# Patient Record
Sex: FEMALE | Race: WHITE | Employment: FULL TIME | URBAN - METROPOLITAN AREA
[De-identification: names, ages, dates, MRNs, and addresses within clinical notes are randomized per-mention and may not be internally consistent; named-entity substitution may affect disease eponyms.]

---

## 2022-11-22 ENCOUNTER — TELEPHONE (OUTPATIENT)
Dept: FAMILY MEDICINE CLINIC | Age: 47
End: 2022-11-22

## 2022-11-22 NOTE — TELEPHONE ENCOUNTER
----- Message from April Cook sent at 11/22/2022  8:52 AM EST -----  Subject: Referral Request    Reason for referral request? Dr. Marlon Denny, Pt is wanting to know if she can   have her immunization records for her new job - she needs to provide her   shot records and believes she had MMR in [id number], can a nurse view this   and call pt? Provider patient wants to be referred to(if known):     Provider Phone Number(if known):     Additional Information for Provider?   ---------------------------------------------------------------------------  --------------  [id number]    [phone]; OK to leave message on voicemail  ---------------------------------------------------------------------------  --------------

## 2023-12-19 RX ORDER — PROMETHAZINE HYDROCHLORIDE 25 MG/1
25 SUPPOSITORY RECTAL EVERY 6 HOURS PRN
COMMUNITY
Start: 2023-12-08 | End: 2023-12-19

## 2023-12-19 RX ORDER — ACETAMINOPHEN 500 MG
TABLET ORAL EVERY 4 HOURS PRN
COMMUNITY

## 2023-12-19 RX ORDER — ESZOPICLONE 3 MG/1
3 TABLET, FILM COATED ORAL PRN
COMMUNITY

## 2023-12-19 RX ORDER — ESZOPICLONE 2 MG/1
2 TABLET, FILM COATED ORAL NIGHTLY
COMMUNITY
End: 2023-12-19

## 2023-12-19 RX ORDER — ONDANSETRON HYDROCHLORIDE 8 MG/1
8 TABLET, FILM COATED ORAL EVERY 8 HOURS PRN
COMMUNITY
Start: 2023-12-02 | End: 2023-12-19

## 2023-12-19 NOTE — PROGRESS NOTES
12/19/2023 1146 AM:       [address] TO PROCEDURE DATE:    1. PLEASE FOLLOW ANY INSTRUCTIONS GIVEN TO YOU PER YOUR SURGEON. 2. Arrange for someone to drive you home and be with you for the first 24 hours after discharge for your safety after your procedure for which you received sedation. Ensure it is someone we can share information with regarding your discharge. NOTE: At this time ONLY 2 ADULTS may accompany you. NO CHILDREN UNDER AGE OF 16. One person ENCOURAGED to stay at hospital entire time if outpatient surgery      3. You must contact your surgeon for instructions IF:  You are taking any blood thinners, aspirin, anti-inflammatory or vitamins. Contact your ordering physician/surgeon for medication instructions as soon as possible, especially if taking blood thinners, aspirin, heart, or diabetic medication. STOP SUPPLEMENTS/VITAMINS/NON-STEROIDAL ANTI-INFLAMMATORY MEDICATION 7 DAYS PRIOR TO PROCEDURE. There is a change in your physical condition such as a cold, fever, rash, cuts, sores, or any other infection, especially near your surgical site. 4. Do not drink alcohol the day before or day of your procedure. Do not use any recreational marijuana at least 24 hours or street drugs (heroin, cocaine) at minimum 5 days prior to your procedure. 5. A Pre-Surgical History and Physical MUST be completed WITHIN 30 DAYS OR LESS prior to your procedure. by your Physician or an Urgent Care        THE DAY OF YOUR PROCEDURE:  1. Follow instructions for ARRIVAL TIME as DIRECTED BY YOUR SURGEON. 2. Enter the MAIN entrance from [address] and follow the signs to the [employer] or [employer] (offered free of charge 7 am-5pm). 3. Enter the Main Entrance of the hospital (do not enter from the lower level of the parking garage).  Upon entrance, check in with the  at the surgical information desk on your LEFT.   Bring your insurance card and photo ID to register      4. DO NOT EAT ANYTHING 8 hours prior to arrival for surgery. You may have up to 8 ounces of water 4 hours prior to your arrival for surgery. NOTE: ALL Gastric, Bariatric & Bowel surgery patients - you MUST follow your surgeon's instructions regarding eating/ drinking as you will have very specific instructions to follow. If you did not receive these, call your surgeon's office immediately. NONE  5. MEDICATIONS:  Take the following medications with a SMALL sip of water: NONE  Use your usual dose of inhalers the morning of surgery. BRING your rescue inhaler with you to hospital.   Anesthesia does NOT want you to take insulin the morning of surgery. They will control your blood sugar while you are at the hospital. Please contact your ordering physician for instructions regarding your insulin the night before your procedure. If you have an insulin pump, please keep it set on basal rate. Bariatric patient's call your surgeon if on diabetic medications as some may need to be stopped 1 week prior to surgery    6. Do not swallow additional water when brushing teeth. No gum, candy, mints, or ice chips. Refrain from smoking or at least decrease the amount on day of surgery. 7. Morning of surgery:   Take a shower with an antibacterial soap (i.e., Safeguard or Dial) OR your physician may have instructed you to use Hibiclens. Dress in loose, comfortable clothing appropriate for redressing after your procedure. Do not wear jewelry (including body piercings), make-up (especially NO eye make-up), fingernail polish (NO toenail polish if foot/leg surgery), lotion, powders, or metal hairclips. Do not shave or wax for 72 hours prior to procedure near your operative site. Shaving with a razor can irritate your skin and make it easier to develop an infection.  On the day of your procedure, any hair that needs to be removed near the surgical site will be

## 2023-12-19 NOTE — PROGRESS NOTES
12/19/2023 1148 AM:    PRESURGICAL BATHING INSTRUCTIONS  [facility] takes many steps to prevent infections during surgery. One way is to provide   you with 4% Chlorhexidine Gluconate (CHG), a special antiseptic soap to wash your skin prior to   surgery. By thoroughly washing your skin, you can reduce the number of germs and help us   prevent an infection. Skin prep is a very important part of getting you ready for surgery. Please   follow the instructions listed below. Do NOT use if you have had an allergic reaction to CHG   previously. Common Brand names:  Betasept, Hibiclens, Hibistat, Exidine, BioScrub, Cristina-Hex, Peridex, Clorostat      Showering Steps Before Your Procedure:  Wash your hair, face and body using your regular soap and shampoo. Rinse your hair and body thoroughly to remove any soap or shampoo residue. Turn the water off to prevent rinsing the antiseptic soap (CHG) off too soon. Wash the body gently for 5 minutes using a clean washcloth wet down with the CHG soap on it. Apply the Chlorhexidine Gluconate (CHG) soap to your entire body only from the neck down. Do not use on your face, eyes, ears, hair or genital area to avoid permanent injury to those areas. Do not scrub the skin too hard. Wash thoroughly paying special attention to the area   where the surgery or procedure will be done. Do not wash with regular soap once CHG is used. Turn the water back on and rinse the body off thoroughly. Pat yourself dry with a clean fresh towel after each shower. Put on clean clothes after each shower. Do not put on lotions or powders after bathing. If any kind of rash appears, stop use and contact your surgeon    A bottle of Chlorhexidine Gluconate CHG) can be purchased at most local pharmacy chain stores. The soap may come in a liquid form, wipes or scrub brush applicator. Any form is fine. Remember   to use prior to your surgery.      If you have any questions, please call and speak with a [facility] PreAdmission Testing   Nurse at [phone].

## 2023-12-19 NOTE — PROGRESS NOTES
12/19/2023 1057 AM:    LMOR W/INSTRUCTIONS AND EMAILED TO PT/TS    H&P IN [facility] 12/11/2023, LABS IN [facility] 12/11/2023- PT/INR/PTT T&S NOT DONE W/H&P-ORDERED DOS/TS EKG IN MEDIA 12/12/2023/TS

## 2023-12-19 NOTE — PROGRESS NOTES
PRE-OP INSTRUCTIONS FOR SURGICAL PATIENTS          Our Pre-admission Testing Nurses tried and were unable to reach you today. Please read the attached instructions AND listen to your voicemail. IF YOU HAVE ANY QUESTIONS PLEASE CALL [phone]. Follow all instructions provided to you from your surgeon's office, including your ARRIVAL TIME. Arrange for someone to drive you home and be with you for the first 24 hours after discharge. NOTE: at this time ONLY 2 ADULTS may accompany you. NO CHILDREN UNDER THE AGE OF 16. One person encouraged to stay at hospital entire time if outpatient surgery    Enter the MAIN entrance located on [address] and report to the surgical desk on the LEFT side of the lobby. Please park in the parking garage or there is free [employer] available after 7am for your use. Bring your insurance card & photo ID with you to register. Bring your medication list with you with dose and frequency listed (including over the counter medications)  Contact your ordering physician/surgeon for medication instructions as soon as possible, especially if taking blood thinners, aspirin, heart, or diabetic medication. STOP VITAMINS/SUPPLEMENTS/NON-STEROIDAL ANTI-INFLAMMATORY MEDICATIONS 7 DAYS PRIOR TO PROCEDURE. A Pre-Surgical History and Physical MUST be completed WITHIN 30 DAYS OR LESS prior to your procedure by your Physician or an Urgent Care. IF TAKING TOPAMAX IN AM MAY TAKE WITH SMALL SIPS OF WATER THE DAY OF SERVICE. DO NOT EAT ANYTHING 8 hours prior to arrival for surgery. You may have sips of WATER ONLY (up to 8 ounces) 4 hours prior to your arrival for surgery. Then nothing further 4 hours prior to arriving at hospital.   No gum, candy, mints, or ice chips day of procedure. Please refrain from drinking alcohol the day before or day of your procedure.    Do not use any recreational marijuana at least 24 hours or street drugs (heroin, cocaine) at minimum 5 days prior to your

## 2023-12-21 ENCOUNTER — HOSPITAL ENCOUNTER (INPATIENT)
Age: 48
LOS: 2 days | Discharge: HOME | DRG: 027 | End: 2023-12-23
Attending: NEUROLOGICAL SURGERY | Admitting: NEUROLOGICAL SURGERY
Payer: COMMERCIAL

## 2023-12-21 ENCOUNTER — APPOINTMENT (OUTPATIENT)
Dept: CT IMAGING | Age: 48
DRG: 027 | End: 2023-12-21
Attending: NEUROLOGICAL SURGERY
Payer: COMMERCIAL

## 2023-12-21 DIAGNOSIS — Z98.890 S/P CRANIOTOMY: Primary | ICD-10-CM

## 2023-12-21 LAB
ABO + RH BLD: NORMAL
APTT BLD: 32.6 SEC (ref 22.7–35.9)
BLD GP AB SCN SERPL QL: NORMAL
HCG UR QL: NEGATIVE
INR PPP: 1.07 (ref 0.84–1.16)
PROTHROMBIN TIME: 13.9 SEC (ref 11.5–14.8)

## 2023-12-21 PROCEDURE — 86850 RBC ANTIBODY SCREEN: CPT

## 2023-12-21 PROCEDURE — 2780000010 HC IMPLANT OTHER: Performed by: NEUROLOGICAL SURGERY

## 2023-12-21 PROCEDURE — 3600000014 HC SURGERY LEVEL 4 ADDTL 15MIN: Performed by: NEUROLOGICAL SURGERY

## 2023-12-21 PROCEDURE — 2709999900 HC NON-CHARGEABLE SUPPLY: Performed by: NEUROLOGICAL SURGERY

## 2023-12-21 PROCEDURE — 6370000000 HC RX 637 (ALT 250 FOR IP): Performed by: NEUROLOGICAL SURGERY

## 2023-12-21 PROCEDURE — 86900 BLOOD TYPING SEROLOGIC ABO: CPT

## 2023-12-21 PROCEDURE — 3700000000 HC ANESTHESIA ATTENDED CARE: Performed by: NEUROLOGICAL SURGERY

## 2023-12-21 PROCEDURE — 6360000002 HC RX W HCPCS: Performed by: PHYSICIAN ASSISTANT

## 2023-12-21 PROCEDURE — 2580000003 HC RX 258: Performed by: ANESTHESIOLOGY

## 2023-12-21 PROCEDURE — 7100000000 HC PACU RECOVERY - FIRST 15 MIN: Performed by: NEUROLOGICAL SURGERY

## 2023-12-21 PROCEDURE — 84703 CHORIONIC GONADOTROPIN ASSAY: CPT

## 2023-12-21 PROCEDURE — 3600000004 HC SURGERY LEVEL 4 BASE: Performed by: NEUROLOGICAL SURGERY

## 2023-12-21 PROCEDURE — 2720000010 HC SURG SUPPLY STERILE: Performed by: NEUROLOGICAL SURGERY

## 2023-12-21 PROCEDURE — 6370000000 HC RX 637 (ALT 250 FOR IP): Performed by: ANESTHESIOLOGY

## 2023-12-21 PROCEDURE — 2000000000 HC ICU R&B

## 2023-12-21 PROCEDURE — 2500000003 HC RX 250 WO HCPCS: Performed by: NEUROLOGICAL SURGERY

## 2023-12-21 PROCEDURE — 85610 PROTHROMBIN TIME: CPT

## 2023-12-21 PROCEDURE — 3700000001 HC ADD 15 MINUTES (ANESTHESIA): Performed by: NEUROLOGICAL SURGERY

## 2023-12-21 PROCEDURE — 6370000000 HC RX 637 (ALT 250 FOR IP): Performed by: PHYSICIAN ASSISTANT

## 2023-12-21 PROCEDURE — 70450 CT HEAD/BRAIN W/O DYE: CPT

## 2023-12-21 PROCEDURE — C1762 CONN TISS, HUMAN(INC FASCIA): HCPCS | Performed by: NEUROLOGICAL SURGERY

## 2023-12-21 PROCEDURE — 2580000003 HC RX 258: Performed by: PHYSICIAN ASSISTANT

## 2023-12-21 PROCEDURE — 6360000002 HC RX W HCPCS: Performed by: ANESTHESIOLOGY

## 2023-12-21 PROCEDURE — 85730 THROMBOPLASTIN TIME PARTIAL: CPT

## 2023-12-21 PROCEDURE — 86901 BLOOD TYPING SEROLOGIC RH(D): CPT

## 2023-12-21 PROCEDURE — 7100000001 HC PACU RECOVERY - ADDTL 15 MIN: Performed by: NEUROLOGICAL SURGERY

## 2023-12-21 PROCEDURE — 00NK0ZZ RELEASE TRIGEMINAL NERVE, OPEN APPROACH: ICD-10-PCS | Performed by: NEUROLOGICAL SURGERY

## 2023-12-21 DEVICE — DURAGEN® PLUS DURAL REGENERATION MATRIX, 3 IN X 3 IN (7.5 CM X 7.5 CM)
Type: IMPLANTABLE DEVICE | Site: BRAIN | Status: FUNCTIONAL
Brand: DURAGEN® PLUS

## 2023-12-21 DEVICE — ULTRA THIN SHEET
Type: IMPLANTABLE DEVICE | Site: BRAIN | Status: FUNCTIONAL
Brand: MEDPOR

## 2023-12-21 RX ORDER — METHOCARBAMOL 500 MG/1
TABLET, FILM COATED ORAL
COMMUNITY
Start: 2023-12-06

## 2023-12-21 RX ORDER — OXYCODONE HYDROCHLORIDE 5 MG/1
10 TABLET ORAL EVERY 4 HOURS PRN
Status: DISCONTINUED | OUTPATIENT
Start: 2023-12-21 | End: 2023-12-23 | Stop reason: HOSPADM

## 2023-12-21 RX ORDER — LIDOCAINE HYDROCHLORIDE 10 MG/ML
1 INJECTION, SOLUTION EPIDURAL; INFILTRATION; INTRACAUDAL; PERINEURAL
Status: DISCONTINUED | OUTPATIENT
Start: 2023-12-21 | End: 2023-12-21 | Stop reason: HOSPADM

## 2023-12-21 RX ORDER — DEXTROAMPHETAMINE SACCHARATE, AMPHETAMINE ASPARTATE, DEXTROAMPHETAMINE SULFATE AND AMPHETAMINE SULFATE 5; 5; 5; 5 MG/1; MG/1; MG/1; MG/1
20 TABLET ORAL 2 TIMES DAILY
Status: DISCONTINUED | OUTPATIENT
Start: 2023-12-21 | End: 2023-12-23 | Stop reason: HOSPADM

## 2023-12-21 RX ORDER — SODIUM CHLORIDE, SODIUM LACTATE, POTASSIUM CHLORIDE, AND CALCIUM CHLORIDE .6; .31; .03; .02 G/100ML; G/100ML; G/100ML; G/100ML
IRRIGANT IRRIGATION PRN
Status: DISCONTINUED | OUTPATIENT
Start: 2023-12-21 | End: 2023-12-21 | Stop reason: HOSPADM

## 2023-12-21 RX ORDER — CLINDAMYCIN PHOSPHATE 900 MG/50ML
900 INJECTION, SOLUTION INTRAVENOUS ONCE
Status: COMPLETED | OUTPATIENT
Start: 2023-12-21 | End: 2023-12-21

## 2023-12-21 RX ORDER — OXYCODONE HYDROCHLORIDE 5 MG/1
10 TABLET ORAL PRN
Status: DISCONTINUED | OUTPATIENT
Start: 2023-12-21 | End: 2023-12-21 | Stop reason: HOSPADM

## 2023-12-21 RX ORDER — SODIUM CHLORIDE 9 MG/ML
INJECTION, SOLUTION INTRAVENOUS CONTINUOUS
Status: DISCONTINUED | OUTPATIENT
Start: 2023-12-21 | End: 2023-12-22

## 2023-12-21 RX ORDER — CLINDAMYCIN PHOSPHATE 900 MG/50ML
900 INJECTION, SOLUTION INTRAVENOUS EVERY 8 HOURS
Status: DISCONTINUED | OUTPATIENT
Start: 2023-12-21 | End: 2023-12-22 | Stop reason: ALTCHOICE

## 2023-12-21 RX ORDER — ACETAMINOPHEN 325 MG/1
650 TABLET ORAL EVERY 6 HOURS
Status: DISCONTINUED | OUTPATIENT
Start: 2023-12-21 | End: 2023-12-23 | Stop reason: HOSPADM

## 2023-12-21 RX ORDER — SODIUM CHLORIDE 0.9 % (FLUSH) 0.9 %
5-40 SYRINGE (ML) INJECTION PRN
Status: DISCONTINUED | OUTPATIENT
Start: 2023-12-21 | End: 2023-12-23 | Stop reason: HOSPADM

## 2023-12-21 RX ORDER — FENTANYL CITRATE 50 UG/ML
25 INJECTION, SOLUTION INTRAMUSCULAR; INTRAVENOUS EVERY 5 MIN PRN
Status: DISCONTINUED | OUTPATIENT
Start: 2023-12-21 | End: 2023-12-21 | Stop reason: HOSPADM

## 2023-12-21 RX ORDER — SODIUM CHLORIDE 0.9 % (FLUSH) 0.9 %
5-40 SYRINGE (ML) INJECTION PRN
Status: DISCONTINUED | OUTPATIENT
Start: 2023-12-21 | End: 2023-12-21 | Stop reason: HOSPADM

## 2023-12-21 RX ORDER — LABETALOL HYDROCHLORIDE 5 MG/ML
10 INJECTION, SOLUTION INTRAVENOUS EVERY 6 HOURS PRN
Status: DISCONTINUED | OUTPATIENT
Start: 2023-12-21 | End: 2023-12-23 | Stop reason: HOSPADM

## 2023-12-21 RX ORDER — SCOLOPAMINE TRANSDERMAL SYSTEM 1 MG/1
1 PATCH, EXTENDED RELEASE TRANSDERMAL
Status: DISCONTINUED | OUTPATIENT
Start: 2023-12-21 | End: 2023-12-23 | Stop reason: HOSPADM

## 2023-12-21 RX ORDER — SODIUM CHLORIDE 0.9 % (FLUSH) 0.9 %
5-40 SYRINGE (ML) INJECTION EVERY 12 HOURS SCHEDULED
Status: DISCONTINUED | OUTPATIENT
Start: 2023-12-21 | End: 2023-12-21 | Stop reason: HOSPADM

## 2023-12-21 RX ORDER — SODIUM CHLORIDE 9 MG/ML
INJECTION, SOLUTION INTRAVENOUS PRN
Status: DISCONTINUED | OUTPATIENT
Start: 2023-12-21 | End: 2023-12-21 | Stop reason: HOSPADM

## 2023-12-21 RX ORDER — METHOCARBAMOL 750 MG/1
750 TABLET, FILM COATED ORAL EVERY 8 HOURS PRN
Status: DISCONTINUED | OUTPATIENT
Start: 2023-12-21 | End: 2023-12-23 | Stop reason: HOSPADM

## 2023-12-21 RX ORDER — SENNA AND DOCUSATE SODIUM 50; 8.6 MG/1; MG/1
2 TABLET, FILM COATED ORAL 2 TIMES DAILY
Status: DISCONTINUED | OUTPATIENT
Start: 2023-12-22 | End: 2023-12-23 | Stop reason: HOSPADM

## 2023-12-21 RX ORDER — OXYCODONE HYDROCHLORIDE 5 MG/1
5 TABLET ORAL PRN
Status: DISCONTINUED | OUTPATIENT
Start: 2023-12-21 | End: 2023-12-21 | Stop reason: HOSPADM

## 2023-12-21 RX ORDER — OXYCODONE HYDROCHLORIDE 5 MG/1
5 TABLET ORAL EVERY 4 HOURS PRN
Status: DISCONTINUED | OUTPATIENT
Start: 2023-12-21 | End: 2023-12-23 | Stop reason: HOSPADM

## 2023-12-21 RX ORDER — HEPARIN SODIUM 5000 [USP'U]/ML
5000 INJECTION, SOLUTION INTRAVENOUS; SUBCUTANEOUS EVERY 8 HOURS SCHEDULED
Status: DISCONTINUED | OUTPATIENT
Start: 2023-12-22 | End: 2023-12-23 | Stop reason: HOSPADM

## 2023-12-21 RX ORDER — SODIUM CHLORIDE 9 MG/ML
INJECTION, SOLUTION INTRAVENOUS PRN
Status: DISCONTINUED | OUTPATIENT
Start: 2023-12-21 | End: 2023-12-23 | Stop reason: HOSPADM

## 2023-12-21 RX ORDER — LABETALOL HYDROCHLORIDE 5 MG/ML
10 INJECTION, SOLUTION INTRAVENOUS
Status: DISCONTINUED | OUTPATIENT
Start: 2023-12-21 | End: 2023-12-21 | Stop reason: HOSPADM

## 2023-12-21 RX ORDER — ACETAMINOPHEN 500 MG
1000 TABLET ORAL ONCE
Status: COMPLETED | OUTPATIENT
Start: 2023-12-21 | End: 2023-12-21

## 2023-12-21 RX ORDER — HYDROMORPHONE HYDROCHLORIDE 1 MG/ML
0.5 INJECTION, SOLUTION INTRAMUSCULAR; INTRAVENOUS; SUBCUTANEOUS EVERY 5 MIN PRN
Status: DISCONTINUED | OUTPATIENT
Start: 2023-12-21 | End: 2023-12-21 | Stop reason: HOSPADM

## 2023-12-21 RX ORDER — PROCHLORPERAZINE EDISYLATE 5 MG/ML
5 INJECTION INTRAMUSCULAR; INTRAVENOUS
Status: COMPLETED | OUTPATIENT
Start: 2023-12-21 | End: 2023-12-21

## 2023-12-21 RX ORDER — SODIUM CHLORIDE 0.9 % (FLUSH) 0.9 %
5-40 SYRINGE (ML) INJECTION EVERY 12 HOURS SCHEDULED
Status: DISCONTINUED | OUTPATIENT
Start: 2023-12-21 | End: 2023-12-23 | Stop reason: HOSPADM

## 2023-12-21 RX ORDER — SODIUM CHLORIDE, SODIUM LACTATE, POTASSIUM CHLORIDE, CALCIUM CHLORIDE 600; 310; 30; 20 MG/100ML; MG/100ML; MG/100ML; MG/100ML
INJECTION, SOLUTION INTRAVENOUS CONTINUOUS
Status: DISCONTINUED | OUTPATIENT
Start: 2023-12-21 | End: 2023-12-21 | Stop reason: HOSPADM

## 2023-12-21 RX ORDER — DIAZEPAM 5 MG/1
5 TABLET ORAL EVERY 6 HOURS PRN
Status: DISCONTINUED | OUTPATIENT
Start: 2023-12-21 | End: 2023-12-23 | Stop reason: HOSPADM

## 2023-12-21 RX ORDER — ONDANSETRON 2 MG/ML
4 INJECTION INTRAMUSCULAR; INTRAVENOUS EVERY 6 HOURS PRN
Status: DISCONTINUED | OUTPATIENT
Start: 2023-12-21 | End: 2023-12-23 | Stop reason: HOSPADM

## 2023-12-21 RX ORDER — FENTANYL CITRATE 50 UG/ML
25 INJECTION, SOLUTION INTRAMUSCULAR; INTRAVENOUS
Status: DISCONTINUED | OUTPATIENT
Start: 2023-12-21 | End: 2023-12-23 | Stop reason: HOSPADM

## 2023-12-21 RX ORDER — ONDANSETRON 4 MG/1
4 TABLET, ORALLY DISINTEGRATING ORAL EVERY 8 HOURS PRN
Status: DISCONTINUED | OUTPATIENT
Start: 2023-12-21 | End: 2023-12-23 | Stop reason: HOSPADM

## 2023-12-21 RX ADMIN — ACETAMINOPHEN 1000 MG: 500 TABLET ORAL at 06:40

## 2023-12-21 RX ADMIN — CLINDAMYCIN PHOSPHATE 900 MG: 900 INJECTION, SOLUTION INTRAVENOUS at 16:31

## 2023-12-21 RX ADMIN — OXYCODONE 5 MG: 5 TABLET ORAL at 18:12

## 2023-12-21 RX ADMIN — OXYCODONE 5 MG: 5 TABLET ORAL at 20:25

## 2023-12-21 RX ADMIN — ACETAMINOPHEN 650 MG: 325 TABLET ORAL at 20:25

## 2023-12-21 RX ADMIN — OXYCODONE 10 MG: 5 TABLET ORAL at 13:39

## 2023-12-21 RX ADMIN — FENTANYL CITRATE 25 MCG: 0.05 INJECTION, SOLUTION INTRAMUSCULAR; INTRAVENOUS at 22:11

## 2023-12-21 RX ADMIN — SODIUM CHLORIDE, POTASSIUM CHLORIDE, SODIUM LACTATE AND CALCIUM CHLORIDE: 600; 310; 30; 20 INJECTION, SOLUTION INTRAVENOUS at 06:25

## 2023-12-21 RX ADMIN — DIAZEPAM 5 MG: 5 TABLET ORAL at 16:35

## 2023-12-21 RX ADMIN — ACETAMINOPHEN 650 MG: 325 TABLET ORAL at 13:34

## 2023-12-21 RX ADMIN — FENTANYL CITRATE 25 MCG: 0.05 INJECTION, SOLUTION INTRAMUSCULAR; INTRAVENOUS at 20:11

## 2023-12-21 RX ADMIN — METHOCARBAMOL 1000 MG: 100 INJECTION INTRAMUSCULAR; INTRAVENOUS at 11:50

## 2023-12-21 RX ADMIN — SODIUM CHLORIDE, PRESERVATIVE FREE 10 ML: 5 INJECTION INTRAVENOUS at 21:21

## 2023-12-21 RX ADMIN — SODIUM CHLORIDE: 9 INJECTION, SOLUTION INTRAVENOUS at 12:59

## 2023-12-21 RX ADMIN — PROCHLORPERAZINE EDISYLATE 5 MG: 5 INJECTION INTRAMUSCULAR; INTRAVENOUS at 11:29

## 2023-12-21 RX ADMIN — FENTANYL CITRATE 25 MCG: 0.05 INJECTION, SOLUTION INTRAMUSCULAR; INTRAVENOUS at 13:32

## 2023-12-21 ASSESSMENT — PAIN DESCRIPTION - LOCATION
LOCATION: HEAD
LOCATION: HEAD;FACE
LOCATION: HEAD
LOCATION: EAR
LOCATION: HEAD
LOCATION: HEAD
LOCATION: HEAD;FACE
LOCATION: FACE;HEAD
LOCATION: EAR
LOCATION: HEAD

## 2023-12-21 ASSESSMENT — PAIN DESCRIPTION - ORIENTATION
ORIENTATION: LEFT
ORIENTATION: POSTERIOR
ORIENTATION: LEFT
ORIENTATION: POSTERIOR

## 2023-12-21 ASSESSMENT — PAIN SCALES - GENERAL
PAINLEVEL_OUTOF10: 4
PAINLEVEL_OUTOF10: 10
PAINLEVEL_OUTOF10: 5
PAINLEVEL_OUTOF10: 3
PAINLEVEL_OUTOF10: 7
PAINLEVEL_OUTOF10: 3
PAINLEVEL_OUTOF10: 0
PAINLEVEL_OUTOF10: 7
PAINLEVEL_OUTOF10: 5
PAINLEVEL_OUTOF10: 7
PAINLEVEL_OUTOF10: 4
PAINLEVEL_OUTOF10: 8
PAINLEVEL_OUTOF10: 8
PAINLEVEL_OUTOF10: 4
PAINLEVEL_OUTOF10: 7
PAINLEVEL_OUTOF10: 4
PAINLEVEL_OUTOF10: 0
PAINLEVEL_OUTOF10: 4
PAINLEVEL_OUTOF10: 5
PAINLEVEL_OUTOF10: 4
PAINLEVEL_OUTOF10: 7

## 2023-12-21 ASSESSMENT — PAIN DESCRIPTION - FREQUENCY
FREQUENCY: CONTINUOUS

## 2023-12-21 ASSESSMENT — PAIN - FUNCTIONAL ASSESSMENT
PAIN_FUNCTIONAL_ASSESSMENT: ACTIVITIES ARE NOT PREVENTED
PAIN_FUNCTIONAL_ASSESSMENT: PREVENTS OR INTERFERES SOME ACTIVE ACTIVITIES AND ADLS
PAIN_FUNCTIONAL_ASSESSMENT: PREVENTS OR INTERFERES SOME ACTIVE ACTIVITIES AND ADLS
PAIN_FUNCTIONAL_ASSESSMENT: ACTIVITIES ARE NOT PREVENTED

## 2023-12-21 ASSESSMENT — PAIN DESCRIPTION - PAIN TYPE
TYPE: SURGICAL PAIN

## 2023-12-21 ASSESSMENT — PAIN DESCRIPTION - DESCRIPTORS
DESCRIPTORS: SORE;BURNING
DESCRIPTORS: STABBING;TINGLING
DESCRIPTORS: ACHING;TINGLING
DESCRIPTORS: SORE
DESCRIPTORS: SORE;BURNING
DESCRIPTORS: TINGLING;THROBBING;ACHING
DESCRIPTORS: ACHING
DESCRIPTORS: ACHING

## 2023-12-21 NOTE — PROGRESS NOTES
Receive pt from Pacu/ hemodynamically stable/ Neuro intact/ pt 7/10 pain at surgical site from CT/ transport-moving/   see emar/ pending emar verification via pharmacy/ see orders/ notes/ flowsheet    1700 OOB to chair/ Neuro unchanged/ continues to have some numbness + tingle left thumb + first 3 fingers/ pt states already feels  much better and is hopeful to go home tomorrow/ see flow sheet/ notes

## 2023-12-21 NOTE — CONSULTS
NEUROCRITICAL CARE CONSULT NOTE       Tip Pierson MD is requesting this consult. Reason for Consult: Post-op ICU management   Admission Chief Complaint: Presented for elective surgery     History of Present Illness     Mary Grace Medina is a 51 y.o. y/o female with a significant history of ADHD, breast cancer, anemia and PONV who presents s/p elective microvascular decompression of the left trigeminal nerve away from the SCA artery. She presented with complaints of un-remmiting, paroxysmal left side facial pain. They were experiencing severe pain within the left V2/3 distribution, occuring several times each day. MRI fiesta scan demonstrated scar and compression of the left trigeminal nerve. The patient elected to proceed with microvascular decompression of the nerve. Post operatively, her facial pain symptoms are markedly improved. Up in chair at my time of visit. REVIEW OF SYSTEMS:   Constitutional- No weight loss or fevers   Eyes- No diplopia. No photophobia. Ears/nose/throat- No dysphagia. No Dysarthria   Cardiovascular- No palpitations. No chest pain   Respiratory- No dyspnea. No Cough   Gastrointestinal- No Abdominal pain. No Vomiting. No nausea   Genitourinary- No incontinence. No urinary retention   Musculoskeletal- No myalgia. No arthralgia   Skin- No rash. No easy bruising. Psychiatric- No depression. No anxiety   Endocrine- No diabetes. No thyroid issues. Hematologic- No bleeding difficulty. No fatigue   Neurologic- +left facial numbness. Mild RUE weakness. Numbness in left hand Denies vision changes. Denies speech or language trouble.      Past Medical, Surgical, Family, and Social History   PAST MEDICAL HISTORY:  Past Medical History:   Diagnosis Date    ADHD (attention deficit hyperactivity disorder), inattentive type     Anemia     Cancer ([address])     BREAST 2021    Face pain     Insomnia     Kidney stone     left, still there    Marijuana use     MEDICAL -NOT CURRENTLY

## 2023-12-21 NOTE — OP NOTE
Operative Report    PATIENT NAME: Tayler Hoyt  YOB: 1975  MEDICAL RECORD# [id number]  SURGERY DATE: 12/21/2023  SURGEON:  Kodi Perez MD, PhD  ASSISTANT:  Rajani Gomez PA-C., served as assistant on this surgery due to the complex nature   of the surgery and the lack of a resident or fellow assistant. She provided assistance with critical tissue retraction at parts of the surgery, wound closure and assistance with protecting critical neuro elements.  DICTATED BY: Kodi Perez MD, PhD      PREOPERATIVE DIAGNOSIS:  1. Left sided trigeminal neuralgia    POSTOPERATIVE DIAGNOSIS:  1. Left sided trigeminal neuralgia    PROCEDURES PERFORMED:  1.  Left retrosigmoid craniectomy    2.  Microvascular decompression of the left trigeminal nerve away from the SCA artery  3.  Microdissection  4.  Neurolysis of trigeminal nerve  5.  Medpor cranioplasty less than 5 cm    ANESTHESIA:  General    INDICATION FOR SURGERY: The patient is a 48 y.o. female who presented with complaints of un-remmiting, paroxysmal left side facial pain.They are currently experiencing severe pain within the left V2/3 distribution, occuring several times each day.  MRI fiesta scan demonstrated scar and compression of the left trigeminal nerve. The patient elected to proceed with microvascular decompression of the nerve. We discussed the risks and benefits to include (but not limited to): bleeding, infection, inability to relieve her pain, CSF leak, need for further surgery, vascular injury, loss of hearing (left), facial weakness, stroke, coma and death. He expressed his understanding of these risks and elected for operative intervention.    PROCEDURE IN DETAIL: The patient was brought to the operating room, general anesthesia was induced and the patient was intubated. The patient was placed in the lateral decubitus position, left side facing upward, with a small gel roll under the right axilla. The head was fixed in a Owen  vessels. This allowed the nerve to relax a take a normal contour. A neurolysis was then performed with the pick,  the fascicles of the nerve. The retractor was then removed from the field and the posterior fossa irrigated. The dura was closed in a water tight fashion using 4-0 Neurolon suture, valsalva to 40 was performed without CSF egress. The wound was copiously irrigated with antibiotic Irrisept irrigation. Saúl Mikey was sprayed over the dura, covered with Duraform and Medpor cranioplasty, less than 5 cm, was affixed with Synthes screws. The galea was closed with interrupted 2-0 Vicryl sutures and the skin was re-approximated with 4-0 monocryl. A dermabond dressing was then applied. The patient was awakened from anesthesia and extubated. Her neurologic exam was stable post-operatively and she was transferred to the PACU. All needle, instrument, and sponge counts were correct. I attest that I was present throughout the entire operation in accordance with CMS guidelines. ESTIMATED BLOOD LOSS: 100 cubic centimeters. FLUIDS: Per Anesthesia. SPECIMENS: No specimens. COMPLICATIONS: None apparent. DRAINS PLACED: None    DISPOSITION: The patient was brought to the intensive care unit awake, following commands, and moving all 4 extremities.

## 2023-12-21 NOTE — PROGRESS NOTES
Clarified w/Dr. Oliver Patel, [address] [facility] for CT scan now, on way to SICU. Order had stated for 1700. Report called to SICU RN.

## 2023-12-21 NOTE — PROGRESS NOTES
Patient alert and oriented x 4. IV x2 placed. Pre op labs drawn and sent to lab. Consents signed and placed on chart.

## 2023-12-21 NOTE — PROGRESS NOTES
Occupational Therapy / Physical Therapy    ATTEMPT NOTE     OT / PT evaluation orders received and chart reviewed. Evaluation attempted but pt sleeping after receiving pain medication upon return to floor. Will follow up in the a.m., 12/22/2023.     Frida Granger OTR/L #[id number]

## 2023-12-21 NOTE — H&P
I saw and examined Rashmi Burger on 12/21/2023 There has been no change to her history or physical in the interval time since consent. She will proceed with the planned procedure.      Angle Silva PA-C

## 2023-12-21 NOTE — PLAN OF CARE
Brought to PACu from OR. Report received from Dr. Shawn Lackey and Jonathan Linares RN. No reported issues in OR. Pt awake, tearful. Following all commands. Oriented. Placed on monitor, VSS. Peripheral pulses palpable. C/o mild left pain behind left ear. Orders received for robaxin ivpb. Cont to monitor.

## 2023-12-21 NOTE — PLAN OF CARE
Problem: Discharge Planning  Goal: Discharge to home or other facility with appropriate resources  12/21/2023 1718 by Dilcia Maradiaga RN  Outcome: Progressing  12/21/2023 1718 by Dilcia Maradiaga RN  Outcome: Progressing     Problem: Pain  Goal: Verbalizes/displays adequate comfort level or baseline comfort level  12/21/2023 1718 by Dilcia Maradiaga RN  Outcome: Progressing  12/21/2023 1718 by Dilcia Maradiaga RN  Outcome: Progressing     Problem: ABCDS Injury Assessment  Goal: Absence of physical injury  12/21/2023 1718 by Dilcia Maradiaga RN  Outcome: Progressing  12/21/2023 1718 by Dilcia Maradiaga RN  Outcome: Progressing     Problem: Safety - Adult  Goal: Free from fall injury  12/21/2023 1718 by Dilcia Maradiaga RN  Outcome: Progressing  12/21/2023 1718 by iDlcia Maradiaga RN  Outcome: Progressing

## 2023-12-22 ENCOUNTER — APPOINTMENT (OUTPATIENT)
Dept: CT IMAGING | Age: 48
DRG: 027 | End: 2023-12-22
Attending: NEUROLOGICAL SURGERY
Payer: COMMERCIAL

## 2023-12-22 PROBLEM — Z98.890 S/P CRANIOTOMY: Status: ACTIVE | Noted: 2023-12-21

## 2023-12-22 LAB
ANION GAP SERPL CALCULATED.3IONS-SCNC: 10 MMOL/L (ref 3–16)
APTT BLD: 31.5 SEC (ref 22.7–35.9)
BASOPHILS # BLD: 0 K/UL (ref 0–0.2)
BASOPHILS NFR BLD: 0.1 %
BUN SERPL-MCNC: 6 MG/DL (ref 7–20)
CALCIUM SERPL-MCNC: 9 MG/DL (ref 8.3–10.6)
CHLORIDE SERPL-SCNC: 103 MMOL/L (ref 99–110)
CO2 SERPL-SCNC: 23 MMOL/L (ref 21–32)
CREAT SERPL-MCNC: 0.5 MG/DL (ref 0.6–1.1)
DEPRECATED RDW RBC AUTO: 12.4 % (ref 12.4–15.4)
EOSINOPHIL # BLD: 0 K/UL (ref 0–0.6)
EOSINOPHIL NFR BLD: 0.1 %
GFR SERPLBLD CREATININE-BSD FMLA CKD-EPI: >60 ML/MIN/{1.73_M2}
GLUCOSE SERPL-MCNC: 98 MG/DL (ref 70–99)
HCT VFR BLD AUTO: 36.9 % (ref 36–48)
HGB BLD-MCNC: 12.6 G/DL (ref 12–16)
INR PPP: 1.08 (ref 0.84–1.16)
LYMPHOCYTES # BLD: 2.5 K/UL (ref 1–5.1)
LYMPHOCYTES NFR BLD: 22.3 %
MCH RBC QN AUTO: 30.6 PG (ref 26–34)
MCHC RBC AUTO-ENTMCNC: 34.3 G/DL (ref 31–36)
MCV RBC AUTO: 89.4 FL (ref 80–100)
MONOCYTES # BLD: 1.1 K/UL (ref 0–1.3)
MONOCYTES NFR BLD: 9.5 %
NEUTROPHILS # BLD: 7.6 K/UL (ref 1.7–7.7)
NEUTROPHILS NFR BLD: 68 %
PLATELET # BLD AUTO: 224 K/UL (ref 135–450)
PMV BLD AUTO: 9 FL (ref 5–10.5)
POTASSIUM SERPL-SCNC: 3.7 MMOL/L (ref 3.5–5.1)
PROTHROMBIN TIME: 14 SEC (ref 11.5–14.8)
RBC # BLD AUTO: 4.13 M/UL (ref 4–5.2)
SODIUM SERPL-SCNC: 136 MMOL/L (ref 136–145)
WBC # BLD AUTO: 11.2 K/UL (ref 4–11)

## 2023-12-22 PROCEDURE — 2060000000 HC ICU INTERMEDIATE R&B

## 2023-12-22 PROCEDURE — 99024 POSTOP FOLLOW-UP VISIT: CPT | Performed by: NURSE PRACTITIONER

## 2023-12-22 PROCEDURE — 85025 COMPLETE CBC W/AUTO DIFF WBC: CPT

## 2023-12-22 PROCEDURE — 85610 PROTHROMBIN TIME: CPT

## 2023-12-22 PROCEDURE — 36415 COLL VENOUS BLD VENIPUNCTURE: CPT

## 2023-12-22 PROCEDURE — 97530 THERAPEUTIC ACTIVITIES: CPT

## 2023-12-22 PROCEDURE — 2580000003 HC RX 258: Performed by: PHYSICIAN ASSISTANT

## 2023-12-22 PROCEDURE — 97116 GAIT TRAINING THERAPY: CPT

## 2023-12-22 PROCEDURE — 6370000000 HC RX 637 (ALT 250 FOR IP): Performed by: PHYSICIAN ASSISTANT

## 2023-12-22 PROCEDURE — 97161 PT EVAL LOW COMPLEX 20 MIN: CPT

## 2023-12-22 PROCEDURE — 80048 BASIC METABOLIC PNL TOTAL CA: CPT

## 2023-12-22 PROCEDURE — 97535 SELF CARE MNGMENT TRAINING: CPT

## 2023-12-22 PROCEDURE — 6360000002 HC RX W HCPCS: Performed by: PHYSICIAN ASSISTANT

## 2023-12-22 PROCEDURE — 70450 CT HEAD/BRAIN W/O DYE: CPT

## 2023-12-22 PROCEDURE — 97165 OT EVAL LOW COMPLEX 30 MIN: CPT

## 2023-12-22 PROCEDURE — 85730 THROMBOPLASTIN TIME PARTIAL: CPT

## 2023-12-22 PROCEDURE — APPNB30 APP NON BILLABLE TIME 0-30 MINS: Performed by: NURSE PRACTITIONER

## 2023-12-22 PROCEDURE — 6370000000 HC RX 637 (ALT 250 FOR IP)

## 2023-12-22 RX ORDER — ONDANSETRON 4 MG/1
4 TABLET, ORALLY DISINTEGRATING ORAL EVERY 8 HOURS PRN
Qty: 15 TABLET | Refills: 0 | Status: SHIPPED | OUTPATIENT
Start: 2023-12-22 | End: 2023-12-27

## 2023-12-22 RX ORDER — SENNA AND DOCUSATE SODIUM 50; 8.6 MG/1; MG/1
2 TABLET, FILM COATED ORAL 2 TIMES DAILY PRN
COMMUNITY
Start: 2023-12-22 | End: 2023-12-29

## 2023-12-22 RX ORDER — DIAZEPAM 5 MG/1
5 TABLET ORAL NIGHTLY PRN
Qty: 7 TABLET | Refills: 0 | Status: SHIPPED | OUTPATIENT
Start: 2023-12-22 | End: 2023-12-29

## 2023-12-22 RX ORDER — OXYCODONE HYDROCHLORIDE 5 MG/1
5-10 TABLET ORAL EVERY 6 HOURS PRN
Qty: 42 TABLET | Refills: 0 | Status: SHIPPED | OUTPATIENT
Start: 2023-12-22 | End: 2023-12-29

## 2023-12-22 RX ADMIN — HEPARIN SODIUM 5000 UNITS: 5000 INJECTION INTRAVENOUS; SUBCUTANEOUS at 06:25

## 2023-12-22 RX ADMIN — HEPARIN SODIUM 5000 UNITS: 5000 INJECTION INTRAVENOUS; SUBCUTANEOUS at 21:58

## 2023-12-22 RX ADMIN — FENTANYL CITRATE 25 MCG: 0.05 INJECTION, SOLUTION INTRAMUSCULAR; INTRAVENOUS at 04:30

## 2023-12-22 RX ADMIN — OXYCODONE 10 MG: 5 TABLET ORAL at 18:02

## 2023-12-22 RX ADMIN — DIAZEPAM 5 MG: 5 TABLET ORAL at 06:25

## 2023-12-22 RX ADMIN — SODIUM CHLORIDE, PRESERVATIVE FREE 10 ML: 5 INJECTION INTRAVENOUS at 21:48

## 2023-12-22 RX ADMIN — CLINDAMYCIN PHOSPHATE 900 MG: 900 INJECTION, SOLUTION INTRAVENOUS at 07:43

## 2023-12-22 RX ADMIN — OXYCODONE 10 MG: 5 TABLET ORAL at 14:00

## 2023-12-22 RX ADMIN — DEXTROAMPHETAMINE SACCHARATE, AMPHETAMINE ASPARTATE, DEXTROAMPHETAMINE SULFATE AND AMPHETAMINE SULFATE 20 MG: 5; 5; 5; 5 TABLET ORAL at 22:02

## 2023-12-22 RX ADMIN — OXYCODONE 10 MG: 5 TABLET ORAL at 03:36

## 2023-12-22 RX ADMIN — ACETAMINOPHEN 650 MG: 325 TABLET ORAL at 18:03

## 2023-12-22 RX ADMIN — ACETAMINOPHEN 650 MG: 325 TABLET ORAL at 02:32

## 2023-12-22 RX ADMIN — SODIUM CHLORIDE, PRESERVATIVE FREE 10 ML: 5 INJECTION INTRAVENOUS at 07:47

## 2023-12-22 RX ADMIN — SODIUM CHLORIDE: 9 INJECTION, SOLUTION INTRAVENOUS at 02:32

## 2023-12-22 RX ADMIN — METHOCARBAMOL 750 MG: 750 TABLET ORAL at 18:01

## 2023-12-22 RX ADMIN — FENTANYL CITRATE 25 MCG: 0.05 INJECTION, SOLUTION INTRAMUSCULAR; INTRAVENOUS at 23:52

## 2023-12-22 RX ADMIN — ACETAMINOPHEN 650 MG: 325 TABLET ORAL at 14:00

## 2023-12-22 RX ADMIN — ACETAMINOPHEN 650 MG: 325 TABLET ORAL at 07:44

## 2023-12-22 RX ADMIN — ONDANSETRON 4 MG: 2 INJECTION INTRAMUSCULAR; INTRAVENOUS at 18:38

## 2023-12-22 RX ADMIN — CLINDAMYCIN PHOSPHATE 900 MG: 900 INJECTION, SOLUTION INTRAVENOUS at 00:45

## 2023-12-22 RX ADMIN — HEPARIN SODIUM 5000 UNITS: 5000 INJECTION INTRAVENOUS; SUBCUTANEOUS at 14:00

## 2023-12-22 RX ADMIN — OXYCODONE 10 MG: 5 TABLET ORAL at 22:01

## 2023-12-22 RX ADMIN — SENNOSIDES AND DOCUSATE SODIUM 2 TABLET: 50; 8.6 TABLET ORAL at 07:44

## 2023-12-22 RX ADMIN — FENTANYL CITRATE 25 MCG: 0.05 INJECTION, SOLUTION INTRAMUSCULAR; INTRAVENOUS at 18:40

## 2023-12-22 RX ADMIN — SENNOSIDES AND DOCUSATE SODIUM 2 TABLET: 50; 8.6 TABLET ORAL at 21:59

## 2023-12-22 RX ADMIN — OXYCODONE 10 MG: 5 TABLET ORAL at 09:37

## 2023-12-22 ASSESSMENT — PAIN DESCRIPTION - ORIENTATION
ORIENTATION: LEFT
ORIENTATION: LEFT
ORIENTATION: LEFT;MID
ORIENTATION: LEFT
ORIENTATION: LEFT;MID
ORIENTATION: LEFT

## 2023-12-22 ASSESSMENT — PAIN DESCRIPTION - LOCATION
LOCATION: HEAD;FACE
LOCATION: HEAD;FACE
LOCATION: HEAD;NECK
LOCATION: HEAD;FACE
LOCATION: INCISION
LOCATION: HEAD;FACE
LOCATION: HEAD;FACE
LOCATION: INCISION
LOCATION: HEAD;FACE
LOCATION: HEAD

## 2023-12-22 ASSESSMENT — PAIN SCALES - GENERAL
PAINLEVEL_OUTOF10: 4
PAINLEVEL_OUTOF10: 7
PAINLEVEL_OUTOF10: 7
PAINLEVEL_OUTOF10: 5
PAINLEVEL_OUTOF10: 5
PAINLEVEL_OUTOF10: 7
PAINLEVEL_OUTOF10: 10
PAINLEVEL_OUTOF10: 7
PAINLEVEL_OUTOF10: 10
PAINLEVEL_OUTOF10: 4
PAINLEVEL_OUTOF10: 4
PAINLEVEL_OUTOF10: 10

## 2023-12-22 ASSESSMENT — PAIN SCALES - WONG BAKER: WONGBAKER_NUMERICALRESPONSE: 0

## 2023-12-22 ASSESSMENT — PAIN - FUNCTIONAL ASSESSMENT
PAIN_FUNCTIONAL_ASSESSMENT: PREVENTS OR INTERFERES SOME ACTIVE ACTIVITIES AND ADLS
PAIN_FUNCTIONAL_ASSESSMENT: PREVENTS OR INTERFERES SOME ACTIVE ACTIVITIES AND ADLS
PAIN_FUNCTIONAL_ASSESSMENT: PREVENTS OR INTERFERES WITH MANY ACTIVE NOT PASSIVE ACTIVITIES

## 2023-12-22 ASSESSMENT — PAIN DESCRIPTION - DESCRIPTORS
DESCRIPTORS: ACHING
DESCRIPTORS: THROBBING
DESCRIPTORS: DISCOMFORT
DESCRIPTORS: TINGLING;ACHING;BURNING
DESCRIPTORS: ACHING
DESCRIPTORS: DISCOMFORT
DESCRIPTORS: TINGLING;BURNING;ACHING
DESCRIPTORS: DISCOMFORT

## 2023-12-22 NOTE — PLAN OF CARE
Problem: Discharge Planning  Goal: Discharge to home or other facility with appropriate resources  12/22/2023 0759 by Ben Hernandez RN  Outcome: Progressing  12/21/2023 2258 by Beatriz Tian RN  Outcome: Progressing  12/21/2023 2258 by Beatriz Tian RN  Outcome: Progressing  Flowsheets (Taken 12/21/2023 2000)  Discharge to home or other facility with appropriate resources:   Identify barriers to discharge with patient and caregiver   Identify discharge learning needs (meds, wound care, etc)     Problem: Pain  Goal: Verbalizes/displays adequate comfort level or baseline comfort level  12/22/2023 0759 by Ben Hernandez RN  Outcome: Progressing  12/21/2023 2258 by Beatriz Tian RN  Outcome: Progressing  12/21/2023 2258 by Beatriz Tian RN  Outcome: Progressing  Flowsheets (Taken 12/21/2023 2000)  Verbalizes/displays adequate comfort level or baseline comfort level:   Administer analgesics based on type and severity of pain and evaluate response   Assess pain using appropriate pain scale   Encourage patient to monitor pain and request assistance   Implement non-pharmacological measures as appropriate and evaluate response     Problem: ABCDS Injury Assessment  Goal: Absence of physical injury  12/22/2023 0759 by Ben Hernandez RN  Outcome: Progressing  Flowsheets (Taken 12/22/2023 0757)  Absence of Physical Injury: Implement safety measures based on patient assessment  12/21/2023 2258 by Beatriz Tian RN  Outcome: Progressing  12/21/2023 2258 by Beatriz Tian RN  Outcome: Progressing  Flowsheets (Taken 12/21/2023 2000)  Absence of Physical Injury: Implement safety measures based on patient assessment     Problem: Safety - Adult  Goal: Free from fall injury  12/22/2023 0759 by Ben Hernandez RN  Outcome: Progressing  Flowsheets (Taken 12/22/2023 0757)  Free From Fall Injury:   Instruct family/caregiver on patient safety   Based on caregiver fall risk screen, instruct family/caregiver to ask

## 2023-12-22 NOTE — PROGRESS NOTES
Physical Therapy  Facility/Department: [facility]  Physical Therapy Initial Assessment/Treatment/Discharge Summary     Name: Micheline Manning  : 1975  MRN: [id number]  Date of Service: 2023    Discharge Recommendations:  Home with assist PRN   PT Equipment Recommendations  Equipment Needed: No      Patient Diagnosis(es): There were no encounter diagnoses. Past Medical History:  has a past medical history of ADHD (attention deficit hyperactivity disorder), inattentive type, Anemia, Cancer ([address]), Face pain, Insomnia, Kidney stone, Marijuana use, PONV (postoperative nausea and vomiting), Prolonged emergence from general anesthesia, Trigeminal neuralgia, and Wears glasses. Past Surgical History:  has a past surgical history that includes craniotomy (Left, 2013); shoulder surgery (Right, 2002); sinus surgery (2004); Cholecystectomy (2005); Appendectomy (2007); Mastectomy, bilateral (Bilateral); and craniotomy (Left, 2023). Assessment   Assessment: 50 y/o pt admitted with trigeminal neuraligia s/p LEFT MICROVASCULAR DECOMPRESSION, RE-EXPLORATION on . Pt currently requiring SUP for transfers and amb. Pt CGA for stairs negotiation. Pt planning to d/c home with A prn from . Pt and  verb no safety concerns about d/c home. Pt with no further acute PT needs at this time - will sign off from PT services.   Therapy Prognosis: Excellent  Decision Making: Low Complexity  Barriers to Learning: none  Requires PT Follow-Up: No  Activity Tolerance  Activity Tolerance: Patient tolerated evaluation without incident;Patient tolerated treatment well     Plan   Physical Therapy Plan  General Plan: Discharge with evaluation only  Safety Devices  Type of Devices: Gait belt, Nurse notified, Left in chair, Call light within reach (no chair alarm needed per RN)     Restrictions  Position Activity Restriction  Other position/activity restrictions: up as tolerated, ambulate

## 2023-12-22 NOTE — PLAN OF CARE
Problem: Discharge Planning  Goal: Discharge to home or other facility with appropriate resources  12/21/2023 2258 by Governor Hanh RN  Outcome: Progressing  12/21/2023 2258 by Governor Hanh RN  Outcome: Progressing  Flowsheets (Taken 12/21/2023 2000)  Discharge to home or other facility with appropriate resources:   Identify barriers to discharge with patient and caregiver   Identify discharge learning needs (meds, wound care, etc)  12/21/2023 1718 by Trista Hastings RN  Outcome: Progressing  12/21/2023 1718 by Trista Hastings RN  Outcome: Progressing     Problem: Pain  Goal: Verbalizes/displays adequate comfort level or baseline comfort level  12/21/2023 2258 by Governor Hanh RN  Outcome: Progressing  12/21/2023 2258 by Governor Hanh RN  Outcome: Progressing  Flowsheets (Taken 12/21/2023 2000)  Verbalizes/displays adequate comfort level or baseline comfort level:   Administer analgesics based on type and severity of pain and evaluate response   Assess pain using appropriate pain scale   Encourage patient to monitor pain and request assistance   Implement non-pharmacological measures as appropriate and evaluate response  12/21/2023 1718 by Trista Hastings RN  Outcome: Progressing  12/21/2023 1718 by Trista Hastings RN  Outcome: Progressing     Problem: ABCDS Injury Assessment  Goal: Absence of physical injury  12/21/2023 2258 by Governor Hanh RN  Outcome: Progressing  12/21/2023 2258 by Governor Hanh RN  Outcome: Progressing  Flowsheets (Taken 12/21/2023 2000)  Absence of Physical Injury: Implement safety measures based on patient assessment  12/21/2023 1718 by Trista Hastings RN  Outcome: Progressing  12/21/2023 1718 by Trista Hastings RN  Outcome: Progressing     Problem: Safety - Adult  Goal: Free from fall injury  12/21/2023 2258 by Governor Hanh RN  Outcome: Progressing  12/21/2023 2258 by Governor Hanh RN  Outcome: Progressing  Flowsheets (Taken 12/21/2023 2000)  Free From Fall Injury: Instruct family/caregiver on patient safety  12/21/2023 1718 by Coralee Schaumann, RN  Outcome: Progressing  12/21/2023 1718 by Coralee Schaumann, RN  Outcome: Progressing     Problem: Neurosensory - Adult  Goal: Achieves stable or improved neurological status  Outcome: Progressing     Problem: Infection - Adult  Goal: Absence of infection at discharge  Outcome: Progressing

## 2023-12-22 NOTE — PLAN OF CARE
Received call from patient's RN that patient is having 10/10 [facility] WILL that woke her from sleep. Reports pain medication has not helped her pain and that patient is experiencing extreme photosensitivity. Patient sent for repeat HCT, which show small amount of hyperdensities along the left cerebellopontine angle region, and adjacent to the left internal auditory canal suggest of small SAH. Repeat HCT appears stable compared to previous.

## 2023-12-22 NOTE — PROGRESS NOTES
Pt c/o pain 7/10 @ 0330. PRN oxy given. Called out again at 0430 for 10/10 headache, primarily on left side. Stating she feels pressure. PRN fentanyl given. NCC called, repeat head CT ordered and completed. Pt still in pain despite fentanyl, though she states it is improving.

## 2023-12-22 NOTE — PROGRESS NOTES
Pt's  called RN to the room. Pt was screaming in pain, difficulty getting words out. Gave pt PRN fentanyl. Pt stated the pain came on quickly and without much warning. Moving forward will give prophylactic prn pain medication to prevent future spells. Pt in agreement.

## 2023-12-22 NOTE — PLAN OF CARE
Briefly, Patient is a 51 y.o. y/o female who presents s/p elective elective microvascular decompression of the left trigeminal nerve away from the SCA artery. Patient is POD #0. Currently, patient reports having 5/10 left sided facial pain and numbness. States the pain started around shift change. She states she held off on taking pain medications, but since has received a dose and is feeling better. She also reports that ice has helped with her facial pain. She denies headache, dizziness, lightheadedness, diplopia, blurred vision or vision loss. Does report having some numbness in her left 1st-3rd fingers and left hand but denies any paresthesias. States she has no other numbness or paresthesias elsewhere. PHYSICAL EXAM:  Vitals:    12/21/23 1800 12/21/23 1900 12/21/23 2011 12/21/23 2025   BP: 130/75 (!) 131/91     Pulse: 87 88     Resp:   22 20   Temp:       TempSrc:       SpO2: 99% 100%     Weight:       Height:             General: Alert, no distress, well-nourished  Neurologic  Mental status:   orientation to person, place, time, situation   Attention intact as able to attend well to the exam     Language fluent in conversation   Comprehension intact; follows simple commands    Cranial nerves:   CN2: Visual fields full w/o extinction on confrontational testing   CN 3,4,6: Pupils equal and reactive to light, extraocular muscles intact  CN5: Facial sensation symmetric   CN7: Face symmetric  CN8: Hearing symmetric to spoken voice  CN9: Palate elevated symmetrically  CN11: Traps full strength on shoulder shrug  CN12: Tongue midline with protrusion    Motor Exam:   R  L    Deltoid 5  5   Biceps 5 5   Triceps 5 5   Wrist extension  5 5   Interossei 5 5      R  L    Hip flexion  5  5   Hip extension  5 5   Knee flexion  5 5   Knee extension  5 5   Ankle dorsiflexion  5 5   Ankle plantar flexion  5 5       Sensory: light touch intact and symmetric in all 4 extremities.   No sensory extinction on bilateral

## 2023-12-22 NOTE — PROGRESS NOTES
Occupational Therapy  Facility/Department: [address] [place] [address]  Occupational Therapy Initial Assessment, Treatment and Discharge    Name: Issa Perez  : 1975  MRN: [id number]  Date of Service: 2023    Discharge Recommendations: home, 24hr assist     OT Equipment Recommendations  Equipment Needed: No       Patient Diagnosis(es): There were no encounter diagnoses. Past Medical History:  has a past medical history of ADHD (attention deficit hyperactivity disorder), inattentive type, Anemia, Cancer ([address]), Face pain, Insomnia, Kidney stone, Marijuana use, PONV (postoperative nausea and vomiting), Prolonged emergence from general anesthesia, Trigeminal neuralgia, and Wears glasses. Past Surgical History:  has a past surgical history that includes craniotomy (Left, 2013); shoulder surgery (Right, 2002); sinus surgery (2004); Cholecystectomy (2005); Appendectomy (2007); Mastectomy, bilateral (Bilateral); and craniotomy (Left, 2023). Assessment   Performance deficits / Impairments: Decreased functional mobility ; Decreased ADL status  Assessment: Pt evaluated POD #1 following LEFT MICROVASCULAR DECOMPRESSION and RE-EXPLORATION. Pt demo mobility and ADLs at supervision level. Educated pt on energy conservation and easing herself back into routine at home. Pt has no additional skilled OT needs upon d/c. Will sign off OT. Pt planning d/c home with initial 24hr assist from family  Decision Making: Low Complexity  REQUIRES OT FOLLOW-UP: No  Activity Tolerance  Activity Tolerance: Patient Tolerated treatment well        Plan   Occupational Therapy Plan  Times Per Week: d/c OT     Restrictions  Position Activity Restriction  Other position/activity restrictions: up as tolerated, ambulate    Subjective   General  Chart Reviewed: Yes  Additional Pertinent Hx: Pt admitted  s/p LEFT MICROVASCULAR DECOMPRESSION, RE-EXPLORATION  Family / Caregiver Present:  Yes ()  Diagnosis: trigeminal neuralgia  Subjective  Subjective: Pt in recliner, agreeable to therapy eval. Pt hopeful to d/c home today. Pain: 5/10 headache, RN aware, pt recently medicated       Social/Functional History  Social/Functional History  Lives With: Spouse  Type of Home: House  Home Layout: One level  Home Access: Stairs to enter without rails  Entrance Stairs - Number of Steps: 2 RICARDO  Bathroom Shower/Tub: Walk-in shower  Bathroom Toilet: Handicap height  Bathroom Equipment: Built-in shower seat  Home Equipment:  (owns no DME)  Has the patient had two or more falls in the past year or any fall with injury in the past year?: No  ADL Assistance: Independent  Homemaking Assistance: Independent  Ambulation Assistance: Independent  Transfer Assistance: Independent  Active : Yes  Occupation: Full time employment  Type of Occupation: OR RN  Leisure & Hobbies: Golfing, watching daughter's basketball games  Additional Comments: , mother and MIL able to assist at d/c.       Objective      Toilet Transfers  Equipment Used: Standard toilet (grab bar)  Toilet Transfer: Supervision    UE Function  AROM: Within functional limits  Strength: Within functional limits  Coordination: Within functional limits  Sensation:  (reports numbness digits of L hand- improving since sx)    ADL  Feeding: Independent  Grooming: Supervision (wash hands, brush teeth, wash face standing at sink)  LE Dressing: Supervision (don underwear)  Toileting: Supervision       Activity Tolerance  Activity Tolerance: Patient tolerated evaluation without incident;Patient tolerated treatment well    Bed mobility  Bed Mobility Comments: NT - pt up in chair at start and end of session.     Transfers  Sit to stand: Supervision (chair)  Stand to sit: Supervision (chair)    Functional Mobility  Equipment: no AD  Level of Assist: supervision  Distance: to/from bathroom, hallway 400'  Comment: steps x4 without rails, CGA    Sitting Balance:

## 2023-12-22 NOTE — PROGRESS NOTES
Pt up to bathroom this AM with no issues or complaints of dizziness. VSS. Pt reports that numbness and tingling in LUE are \"getting better. \" She reports having numbness on the L side of her face from her cheek to her chin and slight tingling in her L pointer finger and thumb.

## 2023-12-22 NOTE — PROGRESS NOTES
4 Eyes Skin Assessment     NAME:  Charlee Hoyt  YOB: 1975  MEDICAL RECORD NUMBER:  [id number]    The patient is being assessed for  Admission    I agree that at least one RN has performed a thorough Head to Toe Skin Assessment on the patient. ALL assessment sites listed below have been assessed. Areas assessed by both nurses:    Head, Face, Ears, Shoulders, Back, Chest, Arms, Elbows, Hands, Sacrum. Buttock, Coccyx, Ischium, Legs. Feet and Heels, and Under Medical Devices         Does the Patient have a Wound?  No noted wound(s)       Martinez Prevention initiated by RN: Yes  Wound Care Orders initiated by RN: No    Pressure Injury (Stage 3,4, Unstageable, DTI, NWPT, and Complex wounds) if present, place Wound referral order by RN under : No    New Ostomies, if present place, Ostomy referral order under : No     Nurse 1 eSignature: Electronically signed by Ruby Walker RN on 12/21/23 at 7:33 PM EST    **SHARE this note so that the co-signing nurse can place an eSignature**    Nurse 2 eSignature: Electronically signed by Ese Adames RN on 12/21/23 at 10:53 PM EST

## 2023-12-22 NOTE — PLAN OF CARE
Neurocritical Care Plan of Care:    Chart reviewed, case discussed with Neurosurgery team, planning to downgrade today. Neurocritical Care will sign off at this time, hospitalist consulted for medical management.     ARMANDO Macias-CNP  Neurology & Neurocritical Care   Neurology Line: [phone]  [facility]: [facility] Neurology & Neuro Critical Care NPs

## 2023-12-22 NOTE — PROGRESS NOTES
Pt requested to ambulate with assistance of . Steady, no c/o dizziness. Walked two laps around the unit with no issues.

## 2023-12-23 VITALS
OXYGEN SATURATION: 98 % | HEART RATE: 79 BPM | RESPIRATION RATE: 16 BRPM | HEIGHT: 67 IN | SYSTOLIC BLOOD PRESSURE: 134 MMHG | DIASTOLIC BLOOD PRESSURE: 82 MMHG | TEMPERATURE: 97 F | WEIGHT: 190.8 LBS | BODY MASS INDEX: 29.95 KG/M2

## 2023-12-23 PROCEDURE — 6360000002 HC RX W HCPCS: Performed by: PHYSICIAN ASSISTANT

## 2023-12-23 PROCEDURE — 6370000000 HC RX 637 (ALT 250 FOR IP): Performed by: PHYSICIAN ASSISTANT

## 2023-12-23 PROCEDURE — 2580000003 HC RX 258: Performed by: PHYSICIAN ASSISTANT

## 2023-12-23 PROCEDURE — 6370000000 HC RX 637 (ALT 250 FOR IP)

## 2023-12-23 RX ADMIN — METHOCARBAMOL 750 MG: 750 TABLET ORAL at 07:38

## 2023-12-23 RX ADMIN — OXYCODONE 10 MG: 5 TABLET ORAL at 10:35

## 2023-12-23 RX ADMIN — ACETAMINOPHEN 650 MG: 325 TABLET ORAL at 07:39

## 2023-12-23 RX ADMIN — ACETAMINOPHEN 650 MG: 325 TABLET ORAL at 02:18

## 2023-12-23 RX ADMIN — HEPARIN SODIUM 5000 UNITS: 5000 INJECTION INTRAVENOUS; SUBCUTANEOUS at 06:20

## 2023-12-23 RX ADMIN — SENNOSIDES AND DOCUSATE SODIUM 2 TABLET: 50; 8.6 TABLET ORAL at 07:43

## 2023-12-23 RX ADMIN — OXYCODONE 5 MG: 5 TABLET ORAL at 06:20

## 2023-12-23 RX ADMIN — SODIUM CHLORIDE, PRESERVATIVE FREE 10 ML: 5 INJECTION INTRAVENOUS at 07:39

## 2023-12-23 RX ADMIN — OXYCODONE 10 MG: 5 TABLET ORAL at 02:18

## 2023-12-23 RX ADMIN — ONDANSETRON 4 MG: 4 TABLET, ORALLY DISINTEGRATING ORAL at 08:30

## 2023-12-23 ASSESSMENT — PAIN SCALES - GENERAL
PAINLEVEL_OUTOF10: 5
PAINLEVEL_OUTOF10: 5
PAINLEVEL_OUTOF10: 6
PAINLEVEL_OUTOF10: 5
PAINLEVEL_OUTOF10: 7
PAINLEVEL_OUTOF10: 5
PAINLEVEL_OUTOF10: 6
PAINLEVEL_OUTOF10: 4

## 2023-12-23 ASSESSMENT — PAIN - FUNCTIONAL ASSESSMENT
PAIN_FUNCTIONAL_ASSESSMENT: PREVENTS OR INTERFERES SOME ACTIVE ACTIVITIES AND ADLS
PAIN_FUNCTIONAL_ASSESSMENT: PREVENTS OR INTERFERES SOME ACTIVE ACTIVITIES AND ADLS

## 2023-12-23 ASSESSMENT — PAIN DESCRIPTION - DESCRIPTORS
DESCRIPTORS: ACHING
DESCRIPTORS: ACHING;DULL

## 2023-12-23 ASSESSMENT — PAIN DESCRIPTION - ORIENTATION
ORIENTATION: LEFT

## 2023-12-23 ASSESSMENT — PAIN DESCRIPTION - LOCATION
LOCATION: FACE;HEAD
LOCATION: HEAD
LOCATION: HEAD
LOCATION: HEAD;FACE

## 2023-12-23 ASSESSMENT — PAIN SCALES - WONG BAKER: WONGBAKER_NUMERICALRESPONSE: 2

## 2023-12-23 NOTE — PLAN OF CARE
Problem: Discharge Planning  Goal: Discharge to home or other facility with appropriate resources  12/23/2023 0932 by Alla Moreno RN  Outcome: Adequate for Discharge  Flowsheets (Taken 12/23/2023 0800)  Discharge to home or other facility with appropriate resources:   Identify barriers to discharge with patient and caregiver   Arrange for needed discharge resources and transportation as appropriate   Identify discharge learning needs (meds, wound care, etc)   Refer to discharge planning if patient needs post-hospital services based on physician order or complex needs related to functional status, cognitive ability or social support system     Problem: Pain  Goal: Verbalizes/displays adequate comfort level or baseline comfort level  12/23/2023 0932 by Alla Moreno RN  Outcome: Adequate for Discharge  Flowsheets (Taken 12/23/2023 0800)  Verbalizes/displays adequate comfort level or baseline comfort level:   Encourage patient to monitor pain and request assistance   Assess pain using appropriate pain scale   Administer analgesics based on type and severity of pain and evaluate response   Implement non-pharmacological measures as appropriate and evaluate response   Consider cultural and social influences on pain and pain management   Notify Licensed Independent Practitioner if interventions unsuccessful or patient reports new pain     Problem: ABCDS Injury Assessment  Goal: Absence of physical injury  12/23/2023 0932 by Alla Moreno RN  Outcome: Adequate for Discharge  Flowsheets (Taken 12/23/2023 0800)  Absence of Physical Injury: Implement safety measures based on patient assessment     Problem: Safety - Adult  Goal: Free from fall injury  12/23/2023 0932 by Alla Moreno RN  Outcome: Adequate for Discharge  Flowsheets (Taken 12/23/2023 0800)  Free From Fall Injury:   Instruct family/caregiver on patient safety   Based on caregiver fall risk screen, instruct family/caregiver to ask for assistance with Problem: Genitourinary - Adult  Goal: Urinary catheter remains patent  12/23/2023 0932 by Concetta Renteria RN  Outcome: Adequate for Discharge

## 2023-12-23 NOTE — PROGRESS NOTES
NEUROSURGERY PROGRESS NOTE    12/22/2023 7:21 PM                               Murphy Leann Niño Lai                      LOS: 1 day   POD# 1 s/p Procedure(s) (LRB):  LEFT MICROVASCULAR DECOMPRESSION, RE-EXPLORATION (Left)    Subjective:  Patient had 10/10 headache overnight prompting a CT Head scan which showed small amount of hyperdensities along the left cerebellopontine angle region, and adjacent to the left internal auditory canal suggest of small SAH. Not unexpected finding after surgery. Patient's headache improved this afternoon and left facial pain has also improved. Physical Exam:  Patient seen and examined    Vitals:    12/22/23 1910   BP:    Pulse:    Resp: 20   Temp:    SpO2:      GCS:  4 - Opens eyes on own  5 - Alert and oriented  6 - Follows simple motor commands  General: Well developed. Alert and cooperative in no acute distress. HENT: atraumatic, neck supple  Eyes: Optic discs: Not tested  Pulmonary: unlabored respiratory effort  Cardiovascular:  Warm well perfused. No peripheral edema  Gastrointestinal: abdomen soft, NT, ND    Neurological:  Mental Status: Awake, alert, oriented x 4, speech clear and appropriate  Attention: Intact  Language: No aphasia or dysarthria noted  Sensation: Intact to all extremities to light touch  Coordination: Intact    Cranial Nerves:  II: Visual acuity not tested, denies new visual changes / diplopia  III, IV, VI: PERRL, 3 mm bilaterally, EOMI, no nystagmus noted  V: Facial sensation intact on right but numb on left  VII: Face symmetric  VIII: Hearing intact bilaterally to spoken voice  IX: Palate movement equal bilaterally  XI: Shoulder shrug equal bilaterally  XII: Tongue midline    Musculoskeletal:   Gait: Not tested   Assist devices: None   Tone: Normal  Motor strength:    Right  Left    Right  Left    Deltoid  5 5  Hip Flex  5 5   Biceps  5 5  Knee Extensors  5 5   Triceps  5 5  Knee Flexors  5 5   Wrist Ext  5 5  Ankle Dorsiflex.   5 5   Wrist Flex  5 5

## 2023-12-23 NOTE — DISCHARGE SUMMARY
Neurosurgery Discharge Summary    Patient ID:   Vu Balderas  [id number]  89 y.o.  1975    Admission Date: 12/21/2023    Discharge Date: 12/23/23      Reason for Admission:   Principal Diagnosis: Trigeminal neuralgia  Secondary Diagnosis:   Patient Active Problem List   Diagnosis    Kidney stone    Trigeminal neuralgia    Insomnia    ADHD (attention deficit hyperactivity disorder), inattentive type    S/P craniotomy       Procedures:   Redo retrosig for MVD    Brief Summary of Patient's Course:   Patient admitted electively for abovementioned procedure. Patient tolerated the procedure well. Post-operatively admitted to the ICU for close monitoring. Transferred to Neurosurgical will on POD 1 where diet advanced and pain well controlled. Mobilized with PT/OT on POD 1 with recommendations for 24 hrs supervision. Reported mild incisional pain and likely improvement in facial pain. Discharged to home in stable condition on POD 2. Discharge Instructions:   -No bending, lifting, or strenuous activity   -Leave incision open to air. May shower. Do not soak incision, no tub baths   -Do not drive or operate heavy machinery while on narcotics   -Please call the office or return to ED for fever >101.5 or purulent drainage from wound     Diet:   common adult     Follow Up Instructions: To office in: in 2 weeks   Please call [phone] to confirm appointment     Condition on Discharge: good    Discharge Medications:     Medication List        START taking these medications      diazePAM 5 MG tablet  Commonly known as: VALIUM  Take 1 tablet by mouth nightly as needed for Sleep (Muscle spasms) for up to 7 days.  Max Daily Amount: 5 mg     ondansetron 4 MG disintegrating tablet  Commonly known as: ZOFRAN-ODT  Take 1 tablet by mouth every 8 hours as needed for Nausea or Vomiting     oxyCODONE 5 MG immediate release tablet  Commonly known as: ROXICODONE  Take 1-2 tablets by mouth every 6 hours as needed for Pain for up

## 2023-12-23 NOTE — PROGRESS NOTES
Neurosurgery Progress Note    Patient seen and examined on 12/23/23. No acute events overnight. Reports mild nausea upon getting to chair this AM. Neurologically stable on exam. Incision C/D/I.        A/P: 52 yo woman POD 2 s/p redo left retrosigmoid craniotomy for MVD    -Neuro stable  -HOB elevated  -Frequent neuro checks  -Pain control with PO meds  -Muscle relaxants prn  -Antiemetics prn  -Mobilize, OOB  -PT/OT  -DVT ppx  -Dispo: Likely DC to home today      Angelique Darby MD, PhD  [address], [address] [facility], [address] [phone] (c), [phone] (o)

## 2023-12-23 NOTE — PROGRESS NOTES
12/23/23 1024   Encounter Summary   Encounter Overview/Reason  Initial Encounter   Service Provided For: Patient   Referral/Consult From: [address] Family members   Last Encounter  12/23/23   Complexity of Encounter Moderate   Begin Time 1000   End Time  1015   Total Time Calculated 15 min   Spiritual/Emotional needs   Type Spiritual Support   Rituals, Rites and Sacraments   Type [place] Communion   Assessment/Intervention/Outcome   Assessment Concerns with suffering; Interrupted family processes   Intervention Active listening;Sustaining Presence/Ministry of presence;Prayer (assurance of)/[place]   Outcome Acceptance;Comfort;Receptive     [address] [place] [address]    Spiritual Care Services:  [phone]

## 2023-12-23 NOTE — CARE COORDINATION
Cleared by neurosurgery. Patient will go home with family with no needs.  Electronically signed by Joann Scanlon RN on 12/23/2023 at 10:34 AM

## 2024-01-02 NOTE — PROGRESS NOTES
Physician Progress Note      PATIENT:               STEPHEN BARTON  CSN #:                  [id number]  :                       1975  ADMIT DATE:       2023 5:39 AM  DISCH DATE:        2023 11:32 AM  RESPONDING  PROVIDER #:        DAVID North CNP          QUERY TEXT:    Pt admitted with Trigeminal Neuralgia. Pt noted to have drop in H/H 12.5/36.3   - 10.9/31.9. If possible, please document in the progress notes and discharge   summary if you are evaluating and/or treating any of the following:    The medical record reflects the following:  Risk Factors: 49 y/o with drop in H/H  Clinical Indicators: Pt noted to have drop in H/H 12.5/36.3 - 10.9/31.9  Treatment: Lab monitoring  Options provided:  -- Post op blood loss anemia  -- Precipitous drop in Hemoglobin and Hematocrit  -- Other - I will add my own diagnosis  -- Disagree - Not applicable / Not valid  -- Disagree - Clinically unable to determine / Unknown  -- Refer to Clinical Documentation Reviewer    PROVIDER RESPONSE TEXT:    This patient has a precipitous drop in Hemoglobin and Hematocrit.    Query created by: Catrachita Love on 2023 9:09 AM      Electronically signed by:  DAVID North CNP 2024 9:07 AM

## (undated) DEVICE — SUTURE VCRL SZ 2-0 L18IN ABSRB UD CT-1 L36MM 1/2 CIR J839D

## (undated) DEVICE — SUTURE ETHLN SZ 4-0 L18IN NONABSORBABLE BLK L24MM FS-1 3/8 1629H

## (undated) DEVICE — SUTURE NRLN SZ 4-0 L18IN NONABSORBABLE BLK L13MM TF 1/2 CIR C584D

## (undated) DEVICE — AGENT HEMOSTATIC SURG ORIGINAL ABS 2X14IN LOOSE KNIT 12/BX

## (undated) DEVICE — BLADE CLIPPER SURG SENSICLIP

## (undated) DEVICE — GLOVE SURG SZ 75 L12IN FNGR THK94MIL TRNSLUC YEL LTX

## (undated) DEVICE — SEALANT SURG 13 YR DURA AUTOSPRAY ADHERUS NUS106] SURGICAL ONE]

## (undated) DEVICE — SUTURE VCRL + SZ 2-0 L18IN ABSRB UD CT1 L36MM 1/2 CIR VCP839D

## (undated) DEVICE — GLOVE SURG SZ 65 CRM LTX FREE POLYISOPRENE POLYMER BEAD ANTI

## (undated) DEVICE — TOOL MR8-9BA50 MR8 9CM BALL 5MM: Brand: MIDAS REX MR8

## (undated) DEVICE — COVER XR CASS W20XL41IN UNIV ADH STRP

## (undated) DEVICE — 450 ML BOTTLE OF 0.05% CHLORHEXIDINE GLUCONATE IN 99.95% STERILE WATER FOR IRRIGATION, USP AND APPLICATOR.: Brand: IRRISEPT ANTIMICROBIAL WOUND LAVAGE

## (undated) DEVICE — WIPE INSTR W73XL73CM VISITEC

## (undated) DEVICE — TOOL MR8-F2/7TA23 MR8 F2/7CM TAPER 2.3MM: Brand: MIDAS REX MR8

## (undated) DEVICE — COVER LT HNDL CAM BLU DISP W/ SURG CTRL

## (undated) DEVICE — SUTURE MCRYL + SZ 4-0 L27IN ABSRB UD L19MM PS-2 3/8 CIR MCP426H

## (undated) DEVICE — SUTURE VCRL + SZ 2-0 L27IN ABSRB WHT SH 1/2 CIR TAPERCUT VCP417H

## (undated) DEVICE — CRANI: Brand: MEDLINE INDUSTRIES, INC.

## (undated) DEVICE — SOLUTION IV 1000ML 0.9% SOD CHL

## (undated) DEVICE — APPLICATOR MEDICATED 10.5 CC SOLUTION HI LT ORNG CHLORAPREP

## (undated) DEVICE — NEURO SPONGES: Brand: DEROYAL

## (undated) DEVICE — UNDERGLOVE SURG SZ 8 BLU LTX FREE SYN POLYISOPRENE POLYMER

## (undated) DEVICE — SURGIFOAM SPNG SZ 100

## (undated) DEVICE — AGENT HEMOSTATIC SURGIFLOW MATRIX KIT W/THROMBIN

## (undated) DEVICE — TOWEL,STOP FLAG GOLD N-W: Brand: MEDLINE

## (undated) DEVICE — AGENT HEMSTAT W2XL4IN OXIDIZED REGENERATED CELOS ABSRB SFT

## (undated) DEVICE — PIN ADLT MAYFIELD RIGID MOLD FINGER

## (undated) DEVICE — SUTURE NONABSORBABLE MONOFILAMENT 2-0 FS 18 IN ETHILON 664H

## (undated) DEVICE — SUTURE VCRL + SZ 3-0 L18IN ABSRB UD SH 1/2 CIR TAPERCUT NDL VCP864D

## (undated) DEVICE — TOBRA BONE BASKET- AUTOGRAFT BONE COLLECTION SYSTEM WITH MESH FILTER AND GRAFT COMPRESSOR: Brand: TOBRA BONE BASKET

## (undated) DEVICE — HOOK RETRACT STERIL 12MM S STL BLNT E STAY LONE STAR

## (undated) DEVICE — GLOVE SURG SZ 6 L12IN FNGR THK75MIL WHT LTX POLYMER BEAD

## (undated) DEVICE — ADAPTER LAB INTMED LUER 17GA